# Patient Record
Sex: MALE | Race: WHITE | NOT HISPANIC OR LATINO
[De-identification: names, ages, dates, MRNs, and addresses within clinical notes are randomized per-mention and may not be internally consistent; named-entity substitution may affect disease eponyms.]

---

## 2022-03-01 PROBLEM — Z00.00 ENCOUNTER FOR PREVENTIVE HEALTH EXAMINATION: Status: ACTIVE | Noted: 2022-03-01

## 2022-03-02 ENCOUNTER — APPOINTMENT (OUTPATIENT)
Dept: CARDIOLOGY | Facility: CLINIC | Age: 72
End: 2022-03-02
Payer: MEDICARE

## 2022-03-02 ENCOUNTER — NON-APPOINTMENT (OUTPATIENT)
Age: 72
End: 2022-03-02

## 2022-03-02 VITALS
HEART RATE: 83 BPM | DIASTOLIC BLOOD PRESSURE: 92 MMHG | WEIGHT: 142 LBS | BODY MASS INDEX: 21.52 KG/M2 | SYSTOLIC BLOOD PRESSURE: 152 MMHG | HEIGHT: 68 IN | RESPIRATION RATE: 14 BRPM

## 2022-03-02 VITALS — DIASTOLIC BLOOD PRESSURE: 88 MMHG | SYSTOLIC BLOOD PRESSURE: 142 MMHG

## 2022-03-02 DIAGNOSIS — Z01.810 ENCOUNTER FOR PREPROCEDURAL CARDIOVASCULAR EXAMINATION: ICD-10-CM

## 2022-03-02 DIAGNOSIS — K40.90 UNILATERAL INGUINAL HERNIA, W/OUT OBSTRUCTION OR GANGRENE, NOT SPECIFIED AS RECURRENT: ICD-10-CM

## 2022-03-02 DIAGNOSIS — R03.0 ELEVATED BLOOD-PRESSURE READING, W/OUT DIAGNOSIS OF HYPERTENSION: ICD-10-CM

## 2022-03-02 DIAGNOSIS — F17.200 NICOTINE DEPENDENCE, UNSPECIFIED, UNCOMPLICATED: ICD-10-CM

## 2022-03-02 DIAGNOSIS — N40.0 BENIGN PROSTATIC HYPERPLASIA WITHOUT LOWER URINARY TRACT SYMPMS: ICD-10-CM

## 2022-03-02 DIAGNOSIS — R94.31 ABNORMAL ELECTROCARDIOGRAM [ECG] [EKG]: ICD-10-CM

## 2022-03-02 DIAGNOSIS — F17.210 NICOTINE DEPENDENCE, CIGARETTES, UNCOMPLICATED: ICD-10-CM

## 2022-03-02 DIAGNOSIS — Z82.5 FAMILY HISTORY OF ASTHMA AND OTHER CHRONIC LOWER RESPIRATORY DISEASES: ICD-10-CM

## 2022-03-02 PROCEDURE — 93000 ELECTROCARDIOGRAM COMPLETE: CPT

## 2022-03-02 PROCEDURE — 99204 OFFICE O/P NEW MOD 45 MIN: CPT

## 2022-03-02 RX ORDER — LEVOTHYROXINE SODIUM 175 UG/1
175 TABLET ORAL DAILY
Refills: 0 | Status: ACTIVE | COMMUNITY

## 2022-03-02 RX ORDER — IBUPROFEN 200 MG/1
200 TABLET, FILM COATED ORAL 3 TIMES DAILY
Refills: 0 | Status: ACTIVE | COMMUNITY

## 2022-03-04 ENCOUNTER — APPOINTMENT (OUTPATIENT)
Dept: CARDIOLOGY | Facility: CLINIC | Age: 72
End: 2022-03-04
Payer: MEDICARE

## 2022-03-04 PROCEDURE — 93306 TTE W/DOPPLER COMPLETE: CPT

## 2022-03-04 PROCEDURE — 93015 CV STRESS TEST SUPVJ I&R: CPT

## 2022-03-07 NOTE — ADDENDUM
[FreeTextEntry1] : March 7, 2022\par \par A transthoracic echocardiogram performed March 4, 2022 showed preserved left ventricular size and systolic function with normal ejection fraction 60-65%; mild enlargement of the ascending aorta and aortic root; mild TR and trace MR;\par \par ; Exercise stress test performed March 4, 2022 showed below average exercise tolerance\par (5-6 metastases of workload); no symptoms of chest pain. Normal blood pressure response.EKG remained negative for any significant ST abnormality or ischemia;\par \par \par Based on the above findings in this patient's otherwise clinical stable cardiac pattern, there is no absolute cardiac contraindication for him to undergo the proposed hernia surgical repair;

## 2022-06-15 ENCOUNTER — APPOINTMENT (OUTPATIENT)
Dept: CARDIOLOGY | Facility: CLINIC | Age: 72
End: 2022-06-15

## 2023-03-07 ENCOUNTER — OFFICE (OUTPATIENT)
Dept: URBAN - METROPOLITAN AREA CLINIC 6 | Facility: CLINIC | Age: 73
Setting detail: OPHTHALMOLOGY
End: 2023-03-07
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
## 2023-03-07 PROCEDURE — NO SHOW FE NO SHOW FEE: Performed by: OPHTHALMOLOGY

## 2023-04-04 ENCOUNTER — OFFICE (OUTPATIENT)
Dept: URBAN - METROPOLITAN AREA CLINIC 6 | Facility: CLINIC | Age: 73
Setting detail: OPHTHALMOLOGY
End: 2023-04-04
Payer: COMMERCIAL

## 2023-04-04 ENCOUNTER — RX ONLY (RX ONLY)
Age: 73
End: 2023-04-04

## 2023-04-04 DIAGNOSIS — H02.834: ICD-10-CM

## 2023-04-04 DIAGNOSIS — H25.13: ICD-10-CM

## 2023-04-04 DIAGNOSIS — D31.40: ICD-10-CM

## 2023-04-04 DIAGNOSIS — D31.31: ICD-10-CM

## 2023-04-04 DIAGNOSIS — H02.831: ICD-10-CM

## 2023-04-04 DIAGNOSIS — H52.4: ICD-10-CM

## 2023-04-04 PROCEDURE — 92014 COMPRE OPH EXAM EST PT 1/>: CPT | Performed by: OPHTHALMOLOGY

## 2023-04-04 PROCEDURE — 92250 FUNDUS PHOTOGRAPHY W/I&R: CPT | Performed by: OPHTHALMOLOGY

## 2023-04-04 PROCEDURE — 92015 DETERMINE REFRACTIVE STATE: CPT | Performed by: OPHTHALMOLOGY

## 2023-04-04 ASSESSMENT — REFRACTION_MANIFEST
OS_VA1: 20/40-2
OD_CYLINDER: -2.25
OS_CYLINDER: -1.25
OU_VA: 20/40-
OS_ADD: +2.50
OS_SPHERE: +4.50
OS_VA1: 20/40-2
OD_AXIS: 085
OU_VA: 20/40-
OD_AXIS: 085
OD_SPHERE: +4.75
OS_SPHERE: +4.50
OD_CYLINDER: -2.25
OD_ADD: +2.50
OD_VA1: 20/40-
OS_CYLINDER: -1.25
OD_VA1: 20/40-
OD_SPHERE: +4.75
OS_AXIS: 085
OS_AXIS: 085

## 2023-04-04 ASSESSMENT — REFRACTION_CURRENTRX
OS_OVR_VA: 20/
OD_VPRISM_DIRECTION: PROGS
OD_SPHERE: +4.00
OS_AXIS: 132
OD_OVR_VA: 20/
OS_VPRISM_DIRECTION: PROGS
OS_CYLINDER: -0.50
OD_CYLINDER: -1.50
OS_SPHERE: +3.25
OS_ADD: +2.25
OD_ADD: +2.25
OD_AXIS: 075

## 2023-04-04 ASSESSMENT — REFRACTION_AUTOREFRACTION
OS_SPHERE: +4.75
OD_SPHERE: +5.00
OS_CYLINDER: -1.25
OS_AXIS: 085
OD_CYLINDER: -2.00
OD_AXIS: 086

## 2023-04-04 ASSESSMENT — KERATOMETRY
OD_K1POWER_DIOPTERS: 39.50
OS_K1POWER_DIOPTERS: 40.25
OD_AXISANGLE_DEGREES: 170
OD_K2POWER_DIOPTERS: 41.25
OS_AXISANGLE_DEGREES: 179
OS_K2POWER_DIOPTERS: 41.50

## 2023-04-04 ASSESSMENT — SPHEQUIV_DERIVED
OD_SPHEQUIV: 3.625
OD_SPHEQUIV: 4
OS_SPHEQUIV: 4.125
OS_SPHEQUIV: 3.875
OD_SPHEQUIV: 3.625
OS_SPHEQUIV: 3.875

## 2023-04-04 ASSESSMENT — AXIALLENGTH_DERIVED
OD_AL: 23.1883
OS_AL: 23.0587
OD_AL: 23.3304
OS_AL: 23.0587
OD_AL: 23.3304
OS_AL: 22.966

## 2023-04-04 ASSESSMENT — CONFRONTATIONAL VISUAL FIELD TEST (CVF)
OD_FINDINGS: FULL
OS_FINDINGS: FULL

## 2023-04-04 ASSESSMENT — LID POSITION - DERMATOCHALASIS
OS_DERMATOCHALASIS: LUL 1+
OD_DERMATOCHALASIS: RUL 1+

## 2023-04-04 ASSESSMENT — TONOMETRY
OS_IOP_MMHG: 18
OD_IOP_MMHG: 14

## 2023-04-04 ASSESSMENT — VISUAL ACUITY
OS_BCVA: 20/40-1
OD_BCVA: 20/60

## 2023-12-14 ENCOUNTER — APPOINTMENT (OUTPATIENT)
Dept: CARDIOLOGY | Facility: CLINIC | Age: 73
End: 2023-12-14